# Patient Record
Sex: FEMALE | Race: BLACK OR AFRICAN AMERICAN | Employment: FULL TIME | URBAN - METROPOLITAN AREA
[De-identification: names, ages, dates, MRNs, and addresses within clinical notes are randomized per-mention and may not be internally consistent; named-entity substitution may affect disease eponyms.]

---

## 2020-09-28 ENCOUNTER — TRANSCRIBE ORDER (OUTPATIENT)
Dept: SCHEDULING | Age: 45
End: 2020-09-28

## 2020-09-29 ENCOUNTER — TRANSCRIBE ORDER (OUTPATIENT)
Dept: SCHEDULING | Age: 45
End: 2020-09-29

## 2020-09-29 DIAGNOSIS — S92.202A: ICD-10-CM

## 2020-09-29 DIAGNOSIS — S92.302A CLOSED FRACTURE OF METATARSAL OF LEFT FOOT: Primary | ICD-10-CM

## 2020-10-03 ENCOUNTER — HOSPITAL ENCOUNTER (OUTPATIENT)
Dept: CT IMAGING | Age: 45
Discharge: HOME | End: 2020-10-03
Attending: PODIATRIST
Payer: COMMERCIAL

## 2020-10-03 DIAGNOSIS — S92.202A: ICD-10-CM

## 2020-10-03 DIAGNOSIS — S92.302A CLOSED FRACTURE OF METATARSAL OF LEFT FOOT: ICD-10-CM

## 2020-10-03 PROCEDURE — 73700 CT LOWER EXTREMITY W/O DYE: CPT

## 2021-01-11 ENCOUNTER — TRANSCRIBE ORDER (OUTPATIENT)
Dept: SCHEDULING | Age: 46
End: 2021-01-11

## 2021-01-11 DIAGNOSIS — R79.89 ABNORMAL THYROID SCREEN (BLOOD): Primary | ICD-10-CM

## 2021-01-20 ENCOUNTER — TRANSCRIBE ORDER (OUTPATIENT)
Dept: SCHEDULING | Age: 46
End: 2021-01-20

## 2021-01-20 DIAGNOSIS — R79.89 ABNORMAL THYROID BLOOD TEST: Primary | ICD-10-CM

## 2021-01-21 ENCOUNTER — TRANSCRIBE ORDER (OUTPATIENT)
Dept: SCHEDULING | Age: 46
End: 2021-01-21

## 2021-01-25 ENCOUNTER — HOSPITAL ENCOUNTER (OUTPATIENT)
Dept: NUCLEAR MEDICINE | Age: 46
Discharge: HOME | End: 2021-01-25
Attending: INTERNAL MEDICINE
Payer: COMMERCIAL

## 2021-01-25 DIAGNOSIS — R79.89 ABNORMAL THYROID BLOOD TEST: ICD-10-CM

## 2021-01-25 PROCEDURE — A9516 IODINE I-123 SOD IODIDE MIC: HCPCS

## 2021-01-26 ENCOUNTER — HOSPITAL ENCOUNTER (OUTPATIENT)
Dept: NUCLEAR MEDICINE | Age: 46
Discharge: HOME | End: 2021-01-26
Attending: INTERNAL MEDICINE
Payer: COMMERCIAL

## 2021-01-26 ENCOUNTER — HOSPITAL ENCOUNTER (OUTPATIENT)
Dept: ULTRASOUND IMAGING | Age: 46
Discharge: HOME | End: 2021-01-26
Attending: INTERNAL MEDICINE
Payer: COMMERCIAL

## 2021-01-26 DIAGNOSIS — R79.89 ABNORMAL THYROID BLOOD TEST: ICD-10-CM

## 2021-03-17 NOTE — PERIOP NOTES
PATIENT CALLED AND MADE AWARE OF COVID-19 TESTING NEEDED TO BE DONE WITHIN 96 HOURS OF SURGERY. COVID-19 TESTING APPOINTMENT MADE FOR PATIENT. PATIENT INSTRUCTED ON SELF QUARANTINE BETWEEN TESTING AND ARRIVAL TIME DAY OF SURGERY. PT OUT OF TOWN ON [place] 3/28/21 (TESTING DATE NEEDED). COVID TESTING APPT MADE FOR 3/29/21 AT 0700. PT UNDERSTANDS THAT SURGERY WILL BE RESCHEDULED IF COVID TEST RESULTS NOT IN FOR DOS.

## 2021-03-29 ENCOUNTER — HOSPITAL ENCOUNTER (OUTPATIENT)
Dept: PREADMISSION TESTING | Age: 46
Discharge: HOME | End: 2021-03-29
Payer: COMMERCIAL

## 2021-03-29 ENCOUNTER — TRANSCRIBE ORDER (OUTPATIENT)
Dept: REGISTRATION | Age: 46
End: 2021-03-29

## 2021-03-29 DIAGNOSIS — Z01.812 PRE-PROCEDURE LAB EXAM: ICD-10-CM

## 2021-03-29 DIAGNOSIS — Z01.812 PRE-PROCEDURE LAB EXAM: Primary | ICD-10-CM

## 2021-03-29 PROCEDURE — U0005 INFEC AGEN DETEC AMPLI PROBE: HCPCS

## 2021-03-30 LAB — SARS-COV-2, COV2NT: NOT DETECTED

## 2021-03-31 RX ORDER — CALCITRIOL 0.5 UG/1
0.5 CAPSULE ORAL 2 TIMES DAILY
COMMUNITY

## 2021-03-31 RX ORDER — CALCIUM CARBONATE/VITAMIN D3 600 MG-125
1 TABLET ORAL 2 TIMES DAILY
COMMUNITY

## 2021-03-31 RX ORDER — METHIMAZOLE 5 MG/1
5 TABLET ORAL 2 TIMES DAILY
COMMUNITY

## 2021-03-31 RX ORDER — BISMUTH SUBSALICYLATE 262 MG
1 TABLET,CHEWABLE ORAL DAILY
COMMUNITY

## 2021-03-31 NOTE — PERIOP NOTES
Phone interview completed with patient. Pre-op instructions reviewed and discussed. Medications reviewed and instructions given on when/if to stop/take prior to surgery. Opportunity given for patient to ask questions, and questions answered.

## 2021-04-01 ENCOUNTER — HOSPITAL ENCOUNTER (OUTPATIENT)
Age: 46
Setting detail: OBSERVATION
Discharge: HOME | End: 2021-04-03
Attending: PODIATRIST | Admitting: PODIATRIST
Payer: COMMERCIAL

## 2021-04-01 ENCOUNTER — ANESTHESIA (OUTPATIENT)
Dept: SURGERY | Age: 46
End: 2021-04-01
Payer: COMMERCIAL

## 2021-04-01 ENCOUNTER — ANESTHESIA EVENT (OUTPATIENT)
Dept: SURGERY | Age: 46
End: 2021-04-01
Payer: COMMERCIAL

## 2021-04-01 DIAGNOSIS — M96.0 NONUNION AFTER ARTHRODESIS: ICD-10-CM

## 2021-04-01 DIAGNOSIS — L91.0 HYPERTROPHIC SCAR: Primary | ICD-10-CM

## 2021-04-01 DIAGNOSIS — G57.92 NERVE ENTRAPMENT SYNDROME OF FOOT, LEFT: ICD-10-CM

## 2021-04-01 PROBLEM — M20.32 HALLUX VARUS (ACQUIRED), LEFT FOOT: Status: ACTIVE | Noted: 2021-04-01

## 2021-04-01 PROBLEM — M24.50 CONTRACTED, JOINT: Status: ACTIVE | Noted: 2021-04-01

## 2021-04-01 LAB — HCG UR QL: NEGATIVE

## 2021-04-01 PROCEDURE — 74011250636 HC RX REV CODE- 250/636: Performed by: NURSE ANESTHETIST, CERTIFIED REGISTERED

## 2021-04-01 PROCEDURE — C1713 ANCHOR/SCREW BN/BN,TIS/BN: HCPCS | Performed by: PODIATRIST

## 2021-04-01 PROCEDURE — 77030026438 HC STYL ET INTUB CARD -A: Performed by: ANESTHESIOLOGY

## 2021-04-01 PROCEDURE — 76210000006 HC OR PH I REC 0.5 TO 1 HR: Performed by: PODIATRIST

## 2021-04-01 PROCEDURE — 77030010396 HC WRE FIX C CNMD -A: Performed by: PODIATRIST

## 2021-04-01 PROCEDURE — 99218 HC RM OBSERVATION: CPT

## 2021-04-01 PROCEDURE — 2709999900 HC NON-CHARGEABLE SUPPLY: Performed by: PODIATRIST

## 2021-04-01 PROCEDURE — 74011000250 HC RX REV CODE- 250: Performed by: PODIATRIST

## 2021-04-01 PROCEDURE — 77030040922 HC BLNKT HYPOTHRM STRY -A

## 2021-04-01 PROCEDURE — 77030020743 HC CAP PROTCT PIN BATR -A: Performed by: PODIATRIST

## 2021-04-01 PROCEDURE — 74011250636 HC RX REV CODE- 250/636: Performed by: PODIATRIST

## 2021-04-01 PROCEDURE — 76060000052 HC ANESTHESIA 10.5 TO 11 HR: Performed by: PODIATRIST

## 2021-04-01 PROCEDURE — 77030006831 HC BLD SAW SAG MCRA -B: Performed by: PODIATRIST

## 2021-04-01 PROCEDURE — 77030035129: Performed by: PODIATRIST

## 2021-04-01 PROCEDURE — 76010000150: Performed by: PODIATRIST

## 2021-04-01 PROCEDURE — 81025 URINE PREGNANCY TEST: CPT

## 2021-04-01 PROCEDURE — 77030025006 HC BUR STRY -C: Performed by: PODIATRIST

## 2021-04-01 PROCEDURE — 74011250636 HC RX REV CODE- 250/636: Performed by: ANESTHESIOLOGY

## 2021-04-01 PROCEDURE — 77030002912 HC SUT ETHBND J&J -A: Performed by: PODIATRIST

## 2021-04-01 PROCEDURE — 77030031139 HC SUT VCRL2 J&J -A: Performed by: PODIATRIST

## 2021-04-01 PROCEDURE — 77030020269 HC MISC IMPL: Performed by: PODIATRIST

## 2021-04-01 PROCEDURE — 77030032613 HC BIT DRL TWST METS -B: Performed by: PODIATRIST

## 2021-04-01 PROCEDURE — C9356 TENOGLIDE TENDON PROT, CM2: HCPCS | Performed by: PODIATRIST

## 2021-04-01 PROCEDURE — 74011000250 HC RX REV CODE- 250: Performed by: NURSE ANESTHETIST, CERTIFIED REGISTERED

## 2021-04-01 PROCEDURE — 77030002916 HC SUT ETHLN J&J -A: Performed by: PODIATRIST

## 2021-04-01 PROCEDURE — 77030006788 HC BLD SAW OSC STRY -B: Performed by: PODIATRIST

## 2021-04-01 PROCEDURE — 77030041542: Performed by: PODIATRIST

## 2021-04-01 PROCEDURE — 77030022324 HC BUR OVAL AGRRES BRSM -B: Performed by: PODIATRIST

## 2021-04-01 PROCEDURE — 77030008684 HC TU ET CUF COVD -B: Performed by: ANESTHESIOLOGY

## 2021-04-01 PROCEDURE — 77030039577 HC WRE K APTUS METS -B: Performed by: PODIATRIST

## 2021-04-01 DEVICE — 2.8 CORTICAL SCREW 20MM, HD7, 1/PKG
Type: IMPLANTABLE DEVICE | Site: FOOT | Status: FUNCTIONAL
Brand: APTUS

## 2021-04-01 DEVICE — 2.8 TRILOCK SCREW 24MM, HD7, 1/PKG
Type: IMPLANTABLE DEVICE | Site: FOOT | Status: FUNCTIONAL
Brand: APTUS

## 2021-04-01 DEVICE — TENOGLIDE TENDON PROTECTOR SHEET 4 IN X 5 IN (10CM X 12.5CM)
Type: IMPLANTABLE DEVICE | Site: FOOT | Status: FUNCTIONAL
Brand: TENOGLIDE®

## 2021-04-01 DEVICE — 2.8 TRILOCK SCREW 32MM, HD7, 1/PKG
Type: IMPLANTABLE DEVICE | Site: FOOT | Status: FUNCTIONAL
Brand: APTUS

## 2021-04-01 DEVICE — 2.8 CORTICAL SCREW 18MM, HD7, 1/PKG
Type: IMPLANTABLE DEVICE | Site: FOOT | Status: FUNCTIONAL
Brand: APTUS

## 2021-04-01 DEVICE — DBM 005001 PROGENIX DBM 1CC SRVC FEE
Type: IMPLANTABLE DEVICE | Site: FOOT | Status: FUNCTIONAL
Brand: PROGENIX® PUTTY AND PROGENIX® PLUS

## 2021-04-01 DEVICE — 2.8 TRILOCK SCREW 16MM, HD7, 1/PKG
Type: IMPLANTABLE DEVICE | Site: FOOT | Status: FUNCTIONAL
Brand: APTUS

## 2021-04-01 DEVICE — 2.8 CORTICAL SCREW 36MM, HD7, 1/PKG
Type: IMPLANTABLE DEVICE | Site: FOOT | Status: FUNCTIONAL
Brand: APTUS

## 2021-04-01 DEVICE — 2.8 TRILOCK SCREW 20MM, HD7, 1/PKG
Type: IMPLANTABLE DEVICE | Site: FOOT | Status: FUNCTIONAL
Brand: APTUS

## 2021-04-01 DEVICE — 2.8 TRILOCK SCREW 22MM, HD7, 1/PKG
Type: IMPLANTABLE DEVICE | Site: FOOT | Status: FUNCTIONAL
Brand: APTUS

## 2021-04-01 DEVICE — IMPLANTABLE DEVICE: Type: IMPLANTABLE DEVICE | Site: FOOT | Status: FUNCTIONAL

## 2021-04-01 RX ORDER — DEXAMETHASONE SODIUM PHOSPHATE 4 MG/ML
INJECTION, SOLUTION INTRA-ARTICULAR; INTRALESIONAL; INTRAMUSCULAR; INTRAVENOUS; SOFT TISSUE AS NEEDED
Status: DISCONTINUED | OUTPATIENT
Start: 2021-04-01 | End: 2021-04-01 | Stop reason: HOSPADM

## 2021-04-01 RX ORDER — MIDAZOLAM HYDROCHLORIDE 1 MG/ML
0.5 INJECTION, SOLUTION INTRAMUSCULAR; INTRAVENOUS
Status: DISCONTINUED | OUTPATIENT
Start: 2021-04-01 | End: 2021-04-01 | Stop reason: HOSPADM

## 2021-04-01 RX ORDER — LIDOCAINE HYDROCHLORIDE 20 MG/ML
INJECTION, SOLUTION EPIDURAL; INFILTRATION; INTRACAUDAL; PERINEURAL AS NEEDED
Status: DISCONTINUED | OUTPATIENT
Start: 2021-04-01 | End: 2021-04-01 | Stop reason: HOSPADM

## 2021-04-01 RX ORDER — MIDAZOLAM HYDROCHLORIDE 1 MG/ML
INJECTION, SOLUTION INTRAMUSCULAR; INTRAVENOUS AS NEEDED
Status: DISCONTINUED | OUTPATIENT
Start: 2021-04-01 | End: 2021-04-01 | Stop reason: HOSPADM

## 2021-04-01 RX ORDER — SODIUM CHLORIDE 9 MG/ML
25 INJECTION, SOLUTION INTRAVENOUS CONTINUOUS
Status: DISCONTINUED | OUTPATIENT
Start: 2021-04-01 | End: 2021-04-03 | Stop reason: HOSPADM

## 2021-04-01 RX ORDER — FENTANYL CITRATE 50 UG/ML
25 INJECTION, SOLUTION INTRAMUSCULAR; INTRAVENOUS
Status: DISCONTINUED | OUTPATIENT
Start: 2021-04-01 | End: 2021-04-01 | Stop reason: HOSPADM

## 2021-04-01 RX ORDER — SUCCINYLCHOLINE CHLORIDE 20 MG/ML
INJECTION INTRAMUSCULAR; INTRAVENOUS AS NEEDED
Status: DISCONTINUED | OUTPATIENT
Start: 2021-04-01 | End: 2021-04-01 | Stop reason: HOSPADM

## 2021-04-01 RX ORDER — ONDANSETRON 2 MG/ML
INJECTION INTRAMUSCULAR; INTRAVENOUS AS NEEDED
Status: DISCONTINUED | OUTPATIENT
Start: 2021-04-01 | End: 2021-04-01 | Stop reason: HOSPADM

## 2021-04-01 RX ORDER — SODIUM CHLORIDE, SODIUM LACTATE, POTASSIUM CHLORIDE, CALCIUM CHLORIDE 600; 310; 30; 20 MG/100ML; MG/100ML; MG/100ML; MG/100ML
75 INJECTION, SOLUTION INTRAVENOUS CONTINUOUS
Status: DISCONTINUED | OUTPATIENT
Start: 2021-04-01 | End: 2021-04-01 | Stop reason: HOSPADM

## 2021-04-01 RX ORDER — SODIUM CHLORIDE 9 MG/ML
50 INJECTION, SOLUTION INTRAVENOUS CONTINUOUS
Status: DISCONTINUED | OUTPATIENT
Start: 2021-04-01 | End: 2021-04-01 | Stop reason: HOSPADM

## 2021-04-01 RX ORDER — DEXMEDETOMIDINE HYDROCHLORIDE 100 UG/ML
INJECTION, SOLUTION INTRAVENOUS AS NEEDED
Status: DISCONTINUED | OUTPATIENT
Start: 2021-04-01 | End: 2021-04-01 | Stop reason: HOSPADM

## 2021-04-01 RX ORDER — ONDANSETRON 2 MG/ML
4 INJECTION INTRAMUSCULAR; INTRAVENOUS
Status: DISCONTINUED | OUTPATIENT
Start: 2021-04-01 | End: 2021-04-02

## 2021-04-01 RX ORDER — BUPIVACAINE HYDROCHLORIDE AND EPINEPHRINE 5; 5 MG/ML; UG/ML
INJECTION, SOLUTION EPIDURAL; INTRACAUDAL; PERINEURAL AS NEEDED
Status: DISCONTINUED | OUTPATIENT
Start: 2021-04-01 | End: 2021-04-01 | Stop reason: HOSPADM

## 2021-04-01 RX ORDER — SODIUM CHLORIDE 0.9 % (FLUSH) 0.9 %
5-40 SYRINGE (ML) INJECTION AS NEEDED
Status: DISCONTINUED | OUTPATIENT
Start: 2021-04-01 | End: 2021-04-01 | Stop reason: HOSPADM

## 2021-04-01 RX ORDER — FENTANYL CITRATE 50 UG/ML
50 INJECTION, SOLUTION INTRAMUSCULAR; INTRAVENOUS AS NEEDED
Status: DISCONTINUED | OUTPATIENT
Start: 2021-04-01 | End: 2021-04-01 | Stop reason: HOSPADM

## 2021-04-01 RX ORDER — METHIMAZOLE 5 MG/1
5 TABLET ORAL 2 TIMES DAILY
Status: DISCONTINUED | OUTPATIENT
Start: 2021-04-02 | End: 2021-04-03 | Stop reason: HOSPADM

## 2021-04-01 RX ORDER — LIDOCAINE HYDROCHLORIDE 10 MG/ML
0.1 INJECTION, SOLUTION EPIDURAL; INFILTRATION; INTRACAUDAL; PERINEURAL AS NEEDED
Status: DISCONTINUED | OUTPATIENT
Start: 2021-04-01 | End: 2021-04-01 | Stop reason: HOSPADM

## 2021-04-01 RX ORDER — PROPOFOL 10 MG/ML
INJECTION, EMULSION INTRAVENOUS AS NEEDED
Status: DISCONTINUED | OUTPATIENT
Start: 2021-04-01 | End: 2021-04-01 | Stop reason: HOSPADM

## 2021-04-01 RX ORDER — HYDROMORPHONE HYDROCHLORIDE 1 MG/ML
0.2 INJECTION, SOLUTION INTRAMUSCULAR; INTRAVENOUS; SUBCUTANEOUS
Status: DISCONTINUED | OUTPATIENT
Start: 2021-04-01 | End: 2021-04-01 | Stop reason: HOSPADM

## 2021-04-01 RX ORDER — MIDAZOLAM HYDROCHLORIDE 1 MG/ML
1 INJECTION, SOLUTION INTRAMUSCULAR; INTRAVENOUS AS NEEDED
Status: DISCONTINUED | OUTPATIENT
Start: 2021-04-01 | End: 2021-04-01 | Stop reason: HOSPADM

## 2021-04-01 RX ORDER — ONDANSETRON 2 MG/ML
4 INJECTION INTRAMUSCULAR; INTRAVENOUS AS NEEDED
Status: DISCONTINUED | OUTPATIENT
Start: 2021-04-01 | End: 2021-04-01 | Stop reason: HOSPADM

## 2021-04-01 RX ORDER — CALCITRIOL 0.25 UG/1
0.5 CAPSULE ORAL 2 TIMES DAILY
Status: DISCONTINUED | OUTPATIENT
Start: 2021-04-02 | End: 2021-04-03 | Stop reason: HOSPADM

## 2021-04-01 RX ORDER — SODIUM CHLORIDE 0.9 % (FLUSH) 0.9 %
5-40 SYRINGE (ML) INJECTION EVERY 8 HOURS
Status: DISCONTINUED | OUTPATIENT
Start: 2021-04-01 | End: 2021-04-01 | Stop reason: HOSPADM

## 2021-04-01 RX ORDER — DIPHENHYDRAMINE HYDROCHLORIDE 50 MG/ML
12.5 INJECTION, SOLUTION INTRAMUSCULAR; INTRAVENOUS AS NEEDED
Status: DISCONTINUED | OUTPATIENT
Start: 2021-04-01 | End: 2021-04-01 | Stop reason: HOSPADM

## 2021-04-01 RX ORDER — KETAMINE HYDROCHLORIDE 10 MG/ML
INJECTION, SOLUTION INTRAMUSCULAR; INTRAVENOUS AS NEEDED
Status: DISCONTINUED | OUTPATIENT
Start: 2021-04-01 | End: 2021-04-01 | Stop reason: HOSPADM

## 2021-04-01 RX ORDER — FENTANYL CITRATE 50 UG/ML
INJECTION, SOLUTION INTRAMUSCULAR; INTRAVENOUS AS NEEDED
Status: DISCONTINUED | OUTPATIENT
Start: 2021-04-01 | End: 2021-04-01 | Stop reason: HOSPADM

## 2021-04-01 RX ORDER — ROCURONIUM BROMIDE 10 MG/ML
INJECTION, SOLUTION INTRAVENOUS AS NEEDED
Status: DISCONTINUED | OUTPATIENT
Start: 2021-04-01 | End: 2021-04-01 | Stop reason: HOSPADM

## 2021-04-01 RX ORDER — OXYCODONE AND ACETAMINOPHEN 5; 325 MG/1; MG/1
1 TABLET ORAL AS NEEDED
Status: DISCONTINUED | OUTPATIENT
Start: 2021-04-01 | End: 2021-04-01 | Stop reason: HOSPADM

## 2021-04-01 RX ORDER — HYDROMORPHONE HCL/0.9% NACL/PF 0.5 MG/ML
PLASTIC BAG, INJECTION (ML) INTRAVENOUS
Status: DISCONTINUED | OUTPATIENT
Start: 2021-04-01 | End: 2021-04-02

## 2021-04-01 RX ORDER — SODIUM CHLORIDE, SODIUM LACTATE, POTASSIUM CHLORIDE, CALCIUM CHLORIDE 600; 310; 30; 20 MG/100ML; MG/100ML; MG/100ML; MG/100ML
INJECTION, SOLUTION INTRAVENOUS
Status: DISCONTINUED | OUTPATIENT
Start: 2021-04-01 | End: 2021-04-01 | Stop reason: HOSPADM

## 2021-04-01 RX ORDER — HYDROMORPHONE HYDROCHLORIDE 2 MG/ML
INJECTION, SOLUTION INTRAMUSCULAR; INTRAVENOUS; SUBCUTANEOUS AS NEEDED
Status: DISCONTINUED | OUTPATIENT
Start: 2021-04-01 | End: 2021-04-01 | Stop reason: HOSPADM

## 2021-04-01 RX ADMIN — PROPOFOL 100 MG: 10 INJECTION, EMULSION INTRAVENOUS at 22:18

## 2021-04-01 RX ADMIN — KETAMINE HYDROCHLORIDE 10 MG: 10 INJECTION, SOLUTION INTRAMUSCULAR; INTRAVENOUS at 12:45

## 2021-04-01 RX ADMIN — SODIUM CHLORIDE, POTASSIUM CHLORIDE, SODIUM LACTATE AND CALCIUM CHLORIDE: 600; 310; 30; 20 INJECTION, SOLUTION INTRAVENOUS at 11:10

## 2021-04-01 RX ADMIN — DEXMEDETOMIDINE HYDROCHLORIDE 10 MCG: 100 INJECTION, SOLUTION, CONCENTRATE INTRAVENOUS at 21:52

## 2021-04-01 RX ADMIN — HYDROMORPHONE HYDROCHLORIDE 0.5 MG: 2 INJECTION, SOLUTION INTRAMUSCULAR; INTRAVENOUS; SUBCUTANEOUS at 20:51

## 2021-04-01 RX ADMIN — PROPOFOL 200 MG: 10 INJECTION, EMULSION INTRAVENOUS at 12:06

## 2021-04-01 RX ADMIN — HYDROMORPHONE HYDROCHLORIDE 0.5 MG: 2 INJECTION, SOLUTION INTRAMUSCULAR; INTRAVENOUS; SUBCUTANEOUS at 16:18

## 2021-04-01 RX ADMIN — KETAMINE HYDROCHLORIDE 10 MG: 10 INJECTION, SOLUTION INTRAMUSCULAR; INTRAVENOUS at 12:32

## 2021-04-01 RX ADMIN — DEXMEDETOMIDINE HYDROCHLORIDE 6 MCG: 100 INJECTION, SOLUTION, CONCENTRATE INTRAVENOUS at 18:58

## 2021-04-01 RX ADMIN — SODIUM CHLORIDE, POTASSIUM CHLORIDE, SODIUM LACTATE AND CALCIUM CHLORIDE: 600; 310; 30; 20 INJECTION, SOLUTION INTRAVENOUS at 17:59

## 2021-04-01 RX ADMIN — ONDANSETRON HYDROCHLORIDE 4 MG: 2 INJECTION, SOLUTION INTRAMUSCULAR; INTRAVENOUS at 20:53

## 2021-04-01 RX ADMIN — WATER 2 G: 1 INJECTION INTRAMUSCULAR; INTRAVENOUS; SUBCUTANEOUS at 16:42

## 2021-04-01 RX ADMIN — SODIUM CHLORIDE 25 ML/HR: 9 INJECTION, SOLUTION INTRAVENOUS at 23:05

## 2021-04-01 RX ADMIN — FENTANYL CITRATE 25 MCG: 50 INJECTION, SOLUTION INTRAMUSCULAR; INTRAVENOUS at 15:24

## 2021-04-01 RX ADMIN — SODIUM CHLORIDE, POTASSIUM CHLORIDE, SODIUM LACTATE AND CALCIUM CHLORIDE 75 ML/HR: 600; 310; 30; 20 INJECTION, SOLUTION INTRAVENOUS at 10:30

## 2021-04-01 RX ADMIN — SODIUM CHLORIDE: 900 INJECTION, SOLUTION INTRAVENOUS at 22:09

## 2021-04-01 RX ADMIN — MIDAZOLAM 3 MG: 1 INJECTION INTRAMUSCULAR; INTRAVENOUS at 11:59

## 2021-04-01 RX ADMIN — LIDOCAINE HYDROCHLORIDE 60 MG: 20 INJECTION, SOLUTION EPIDURAL; INFILTRATION; INTRACAUDAL; PERINEURAL at 12:06

## 2021-04-01 RX ADMIN — WATER 2 G: 1 INJECTION INTRAMUSCULAR; INTRAVENOUS; SUBCUTANEOUS at 20:51

## 2021-04-01 RX ADMIN — WATER 2 G: 1 INJECTION INTRAMUSCULAR; INTRAVENOUS; SUBCUTANEOUS at 12:24

## 2021-04-01 RX ADMIN — FENTANYL CITRATE 50 MCG: 50 INJECTION, SOLUTION INTRAMUSCULAR; INTRAVENOUS at 14:42

## 2021-04-01 RX ADMIN — KETAMINE HYDROCHLORIDE 10 MG: 10 INJECTION, SOLUTION INTRAMUSCULAR; INTRAVENOUS at 12:24

## 2021-04-01 RX ADMIN — FENTANYL CITRATE 25 MCG: 50 INJECTION, SOLUTION INTRAMUSCULAR; INTRAVENOUS at 12:06

## 2021-04-01 RX ADMIN — HYDROMORPHONE HYDROCHLORIDE 0.5 MG: 2 INJECTION, SOLUTION INTRAMUSCULAR; INTRAVENOUS; SUBCUTANEOUS at 20:12

## 2021-04-01 RX ADMIN — KETAMINE HYDROCHLORIDE 10 MG: 10 INJECTION, SOLUTION INTRAMUSCULAR; INTRAVENOUS at 12:39

## 2021-04-01 RX ADMIN — SUCCINYLCHOLINE CHLORIDE 160 MG: 20 INJECTION, SOLUTION INTRAMUSCULAR; INTRAVENOUS at 12:07

## 2021-04-01 RX ADMIN — Medication: at 23:00

## 2021-04-01 RX ADMIN — KETAMINE HYDROCHLORIDE 10 MG: 10 INJECTION, SOLUTION INTRAMUSCULAR; INTRAVENOUS at 12:28

## 2021-04-01 RX ADMIN — ROCURONIUM BROMIDE 10 MG: 10 SOLUTION INTRAVENOUS at 12:06

## 2021-04-01 RX ADMIN — DEXMEDETOMIDINE HYDROCHLORIDE 4 MCG: 100 INJECTION, SOLUTION, CONCENTRATE INTRAVENOUS at 19:04

## 2021-04-01 RX ADMIN — HYDROMORPHONE HYDROCHLORIDE 0.5 MG: 2 INJECTION, SOLUTION INTRAMUSCULAR; INTRAVENOUS; SUBCUTANEOUS at 12:31

## 2021-04-01 RX ADMIN — DEXAMETHASONE SODIUM PHOSPHATE 8 MG: 4 INJECTION, SOLUTION INTRAMUSCULAR; INTRAVENOUS at 12:23

## 2021-04-01 NOTE — ANESTHESIA PREPROCEDURE EVALUATION
Relevant Problems   No relevant active problems       Anesthetic History   No history of anesthetic complications            Review of Systems / Medical History  Patient summary reviewed, nursing notes reviewed and pertinent labs reviewed    Pulmonary  Within defined limits                 Neuro/Psych   Within defined limits           Cardiovascular  Within defined limits                Exercise tolerance: >4 METS     GI/Hepatic/Renal     GERD: well controlled           Endo/Other      Hypothyroidism  Morbid obesity     Other Findings   Comments: Denies pregnancy           Physical Exam    Airway  Mallampati: III  TM Distance: 4 - 6 cm  Neck ROM: normal range of motion, short neck   Mouth opening: Normal     Cardiovascular  Regular rate and rhythm,  S1 and S2 normal,  no murmur, click, rub, or gallop  Rhythm: regular  Rate: normal         Dental    Dentition: Caps/crowns and Implants     Pulmonary  Breath sounds clear to auscultation               Abdominal  GI exam deferred       Other Findings            Anesthetic Plan    ASA: 3  Anesthesia type: general          Induction: Intravenous  Anesthetic plan and risks discussed with: Patient Quality 47: Advance Care Plan: Advance Care Planning discussed and documented; advance care plan or surrogate decision maker documented in the medical record.

## 2021-04-01 NOTE — PERIOP NOTES
1300:  Lucia Cameron [phone] updated on start of surgical procedure by RN.     1500:  updated on surgery progress

## 2021-04-02 PROCEDURE — 74011250636 HC RX REV CODE- 250/636: Performed by: STUDENT IN AN ORGANIZED HEALTH CARE EDUCATION/TRAINING PROGRAM

## 2021-04-02 PROCEDURE — 74011250637 HC RX REV CODE- 250/637: Performed by: PODIATRIST

## 2021-04-02 PROCEDURE — 96374 THER/PROPH/DIAG INJ IV PUSH: CPT

## 2021-04-02 PROCEDURE — 97161 PT EVAL LOW COMPLEX 20 MIN: CPT

## 2021-04-02 PROCEDURE — 74011000250 HC RX REV CODE- 250: Performed by: PODIATRIST

## 2021-04-02 PROCEDURE — 97116 GAIT TRAINING THERAPY: CPT

## 2021-04-02 PROCEDURE — 99218 HC RM OBSERVATION: CPT

## 2021-04-02 PROCEDURE — 74011250636 HC RX REV CODE- 250/636: Performed by: PODIATRIST

## 2021-04-02 PROCEDURE — 74011250637 HC RX REV CODE- 250/637: Performed by: STUDENT IN AN ORGANIZED HEALTH CARE EDUCATION/TRAINING PROGRAM

## 2021-04-02 RX ORDER — PROMETHAZINE HYDROCHLORIDE 25 MG/1
25 TABLET ORAL
Status: DISCONTINUED | OUTPATIENT
Start: 2021-04-02 | End: 2021-04-03 | Stop reason: HOSPADM

## 2021-04-02 RX ORDER — OXYCODONE AND ACETAMINOPHEN 7.5; 325 MG/1; MG/1
1 TABLET ORAL
Status: DISCONTINUED | OUTPATIENT
Start: 2021-04-02 | End: 2021-04-02

## 2021-04-02 RX ORDER — OXYCODONE HYDROCHLORIDE 5 MG/1
7.5 TABLET ORAL
Status: DISCONTINUED | OUTPATIENT
Start: 2021-04-02 | End: 2021-04-02

## 2021-04-02 RX ORDER — ACETAMINOPHEN 325 MG/1
325 TABLET ORAL EVERY 4 HOURS
Status: DISCONTINUED | OUTPATIENT
Start: 2021-04-02 | End: 2021-04-03 | Stop reason: HOSPADM

## 2021-04-02 RX ORDER — OXYCODONE HYDROCHLORIDE 5 MG/1
10 TABLET ORAL
Status: DISCONTINUED | OUTPATIENT
Start: 2021-04-02 | End: 2021-04-03 | Stop reason: HOSPADM

## 2021-04-02 RX ORDER — OXYCODONE AND ACETAMINOPHEN 7.5; 325 MG/1; MG/1
1 TABLET ORAL
Qty: 15 TAB | Refills: 0 | Status: SHIPPED | OUTPATIENT
Start: 2021-04-02 | End: 2021-04-05

## 2021-04-02 RX ORDER — PROMETHAZINE HYDROCHLORIDE 12.5 MG/1
25 TABLET ORAL
Qty: 30 TAB | Refills: 2 | Status: SHIPPED | OUTPATIENT
Start: 2021-04-02

## 2021-04-02 RX ORDER — HYDROMORPHONE HYDROCHLORIDE 1 MG/ML
0.2 INJECTION, SOLUTION INTRAMUSCULAR; INTRAVENOUS; SUBCUTANEOUS
Status: DISCONTINUED | OUTPATIENT
Start: 2021-04-02 | End: 2021-04-03 | Stop reason: HOSPADM

## 2021-04-02 RX ADMIN — OXYCODONE 10 MG: 5 TABLET ORAL at 21:26

## 2021-04-02 RX ADMIN — HYDROMORPHONE HYDROCHLORIDE 0.2 MG: 1 INJECTION, SOLUTION INTRAMUSCULAR; INTRAVENOUS; SUBCUTANEOUS at 22:44

## 2021-04-02 RX ADMIN — ACETAMINOPHEN 325 MG: 325 TABLET ORAL at 16:58

## 2021-04-02 RX ADMIN — OXYCODONE 10 MG: 5 TABLET ORAL at 16:58

## 2021-04-02 RX ADMIN — METHIMAZOLE 5 MG: 5 TABLET ORAL at 17:00

## 2021-04-02 RX ADMIN — OXYCODONE 7.5 MG: 5 TABLET ORAL at 13:07

## 2021-04-02 RX ADMIN — ONDANSETRON 4 MG: 2 INJECTION INTRAMUSCULAR; INTRAVENOUS at 05:27

## 2021-04-02 RX ADMIN — CALCITRIOL CAPSULES 0.25 MCG 0.5 MCG: 0.25 CAPSULE ORAL at 09:16

## 2021-04-02 RX ADMIN — ACETAMINOPHEN 325 MG: 325 TABLET ORAL at 21:27

## 2021-04-02 RX ADMIN — ACETAMINOPHEN 325 MG: 325 TABLET ORAL at 13:07

## 2021-04-02 RX ADMIN — HYDROMORPHONE HYDROCHLORIDE 0.2 MG: 1 INJECTION, SOLUTION INTRAMUSCULAR; INTRAVENOUS; SUBCUTANEOUS at 11:59

## 2021-04-02 RX ADMIN — CALCITRIOL CAPSULES 0.25 MCG 0.5 MCG: 0.25 CAPSULE ORAL at 18:00

## 2021-04-02 RX ADMIN — HYDROMORPHONE HYDROCHLORIDE 0.2 MG: 1 INJECTION, SOLUTION INTRAMUSCULAR; INTRAVENOUS; SUBCUTANEOUS at 18:26

## 2021-04-02 RX ADMIN — METHIMAZOLE 5 MG: 5 TABLET ORAL at 09:16

## 2021-04-02 RX ADMIN — OXYCODONE HYDROCHLORIDE AND ACETAMINOPHEN 1 TABLET: 7.5; 325 TABLET ORAL at 09:16

## 2021-04-02 RX ADMIN — HYDROMORPHONE HYDROCHLORIDE 0.2 MG: 1 INJECTION, SOLUTION INTRAMUSCULAR; INTRAVENOUS; SUBCUTANEOUS at 14:53

## 2021-04-02 RX ADMIN — CEFAZOLIN SODIUM 2 G: 1 INJECTION, POWDER, FOR SOLUTION INTRAMUSCULAR; INTRAVENOUS at 13:09

## 2021-04-02 RX ADMIN — PROMETHAZINE HYDROCHLORIDE 25 MG: 25 TABLET ORAL at 16:58

## 2021-04-02 RX ADMIN — CEFAZOLIN SODIUM 2 G: 1 INJECTION, POWDER, FOR SOLUTION INTRAMUSCULAR; INTRAVENOUS at 05:19

## 2021-04-02 NOTE — OP NOTES
Operative Report    Patient: Fred Baldwin MRN: [id number]  SSN: [id number]    YOB: 1975  Age: 39 y.o. Sex: female       Date of Surgery: 4/1/2021     Preoperative Diagnosis: NONUNION AFTER ARTHRODESIS, HYPERTROPHIC SCAR, CONTRACTED JOINT, NERVE ENTRAPMENT SYNDROME - LEFT FOOT     Postoperative Diagnosis: NONUNION AFTER ARTHRODESIS, HYPERTROPHIC SCAR, CONTRACTED JOINT, NERVE ENTRAPMENT SYNDROME - LEFT FOOT     Surgeon(s) and Role:     * Cooper Blake DPM - Primary     * Debbie Akhtar DPM    Anesthesia: General     Procedure: Procedure(s):  REPAIR OF NONUNION TARSAL METATARSAL 1, RELEASE OF METATARSAL PHALANGEAL JOINT 1 WITH LENGTHENING OF EXTENSOR HALLUCIS LONGUS, REVISION OF SCAR, RELEASE OF NERVE, HARDWARE REMOVAL, BONE MARROW ASPIRATION - APPLICATION OF INCISIONAL WOUND VAC-LEFT FOOT     Procedure in Detail: Pt was brought into OR in stretcher, moved OR table, and positioned in supine. A team time out was conducted with all team members present. Anesthesia sedated patient and gave the go ahead for a local block. A local block was performed with 20cc of 0.5% bupivacaine with epi in a conte block like fashion. A thigh tourniquet was placed but not inflated. Pt was prepped and draped in a normal sterile fashion. Attention was drawn to the left foot, cicatrix overlying 1st mtpj was excised using 10 blade. Incision was then carried down to the level of the fascia. Any bleeders or blood vessels were cauterized with bovie. At this time, the medial dorsal cutaenous nerve was identified proximally to the previous cicatrix and then dissected/isolated distally in order to release it from the extensive hyperfibrotic soft tissue. The extensor tendon was identified and also noted to be surrounded in extensive hyperfibrotic soft tissue. The 1st MTP joint was exposed, using mcglamry's elevator to release any adhesions to the metatarsal head.  However the hallux remained in a dorsiflexed position and the EHL was bowstrung. At this time, it was decided to lengthen tendon with a z lengthening but anticipated repair once osseous work was completed. The proximal aspect of the incision was then carried down to the level of the bone. 2 staples were identified and a screw. These were then explanted. At this time, the 1st tarsometatarsal joing was identified to have a non-union and laterally displaced distal fragment. The non-union site was then resected and debrided down to the level of healthy bleeding bone, paprika sign observed. Medial cuneiform and 1st met were fenestrated with a 2.0 drill. A deficit in length was identified and measured to be 20mm. A tricortical allograft was used in order to gain length and restore the parabola, verified on fluroscopy. Due to the previous nonunion, bone marrow aspirate was obtained from the distal tibia on the medial side. [place] site confirmed with fluroscopy. The allograft was then soaked in the bone marrow aspirate. The positioning of the graft into the 1st TMT realigned the 1st ray, reducing the IM angle while restoring the length of the parabola. This was confirmed on fluoroscopy. Demineralized bone matrix was used to augment any small crevices between either side of the graft. Provisional fixation obtained with 0.062in kwires. 90-90 co-plating technique was used to secure graft and maintain positioning. Dorsal plate was applied using a combination of locking and non-locking 2.8mm screws per 's protocol while observing AO principles. A medial plate was applied using a combination of locking and non-locking 2.8mm screws per 's protocol while observing AO principles. Positioning was confirmed on fluoroscopy. At this time, the EHL was repaired with 2.0 vycril at physiological tension. The incision site was then irrigated with copious amounts of sterile normal saline. Deep closure was done with 4.0 vycril.  Skin closure with 4.0 nylon. An incisional vac was applied per 's protocol. A posterior splint was applied. Pt was then awakened by anesthesia, moved to stretcher, and transferred to PACU in a normal and stable condition. Estimated Blood Loss:  <50cc    Tourniquet Time: * No tourniquets in log *      Implants:   Implant Name Type Inv. Item Serial No.  Lot No. LRB No. Used Action   GRAFT BNE L TRICORT BLK FOR OSTEOTMY ALLOPURE - [id number]  GRAFT BNE L TRICORT BLK FOR OSTEOTMY ALLOPURE [id number] [address] [employer]_WD NA Left 1 Implanted   2.8 MTP Revision Plate 5 Right   NA  NA Left 1 Implanted   2.8 lockingstraight plate 8 hole Screw  NA  NA Left 1 Implanted   GRAFT BNE SUB 1CC DEMIN BNE MTRX PTTY FRZ DRY PROGENIX - [id number]  GRAFT BNE SUB 1CC DEMIN BNE MTRX PTTY FRZ DRY PROGENIX [id number] [employer] SPINALGRA[employer]_ [id number] Left 1 Implanted   PROTECTOR TEND SHT IMPL 4X5IN -- TENOGLIDE - SNA  PROTECTOR TEND SHT IMPL 4X5IN -- TENOGLIDE NA [employer] TWAN [id number] Left 1 Implanted   SCREW BNE 2.8X20MM HEBER G PKG 1 HEXADRIVE 7 - SNA  SCREW BNE 2.8X20MM HEBER G PKG 1 HEXADRIVE 7 NA [employer]WD NA Left 1 Implanted   SCREW BNE L36MM OD28MM HEBER TI ALLY ST SELF DRL LO PROF - SNA  SCREW BNE L36MM OD28MM HEBER TI ALLY ST SELF DRL LO PROF NA MEDARTIS INC_WD NA Left 1 Implanted   SCREW BNE L16MM DIA2. 8MM HEBER TI ST SELF DRL NONLOCKING HEX - SNA  SCREW BNE L16MM DIA2. 8MM HEBER TI ST SELF DRL NONLOCKING HEX NA [employer]WD NA Left 4 Implanted   SCREW 2.8X32MM HEXA DRV 7 BENJY TRILOK - SNA  SCREW 2.8X32MM HEXA DRV 7 BENJY [employer]ILOK NA [employer]WD NA Left 1 Implanted   SCREW BNE 2.8X20MM BENJY PKG 1 HEXADRIVE 7 TRILOK - SNA  SCREW BNE 2.8X20MM BENJY PKG 1 HEXADRIVE 7 TRILOK NA [employer]_WD NA Left 1 Implanted   SCREW BNE 2.8X20MM BENJY PKG 1 HEXADRIVE 7 TRILOK - SNA  SCREW BNE 2.8X20MM BENJY PKG 1 HEXADRIVE 7 TRILOK NA [employer]_WD NA Left 1 Implanted   SCREW BNE 2.8X18MM HEBER G PKG 1 HEXADRIVE 7 - SNA  SCREW BNE 2.8X18MM HEBER G PKG 1 HEXADRIVE 7 NA [employer]_WD NA Left 1 Implanted   SCREW BNE [id number] BENJY PKG 1 HEXADRIVE 7 TRILOK - SNA  SCREW BNE [id number] BENJY PKG 1 HEXADRIVE 7 TRILOK NA [employer] INC_WD NA Left 1 Implanted   SCREW BNE [id number] BENJY PKG 1 HEXADRIVE 7 TRILOK - SNA  SCREW BNE [id number] BENJY PKG 1 HEXADRIVE 7 TRILOK NA [employer] INC_WD NA Left 1 Implanted               Specimens: * No specimens in log *        Drains: None                Complications: None    Counts: Sponge and needle counts were correct times two.     Signed By:  Ursula Meyer DPM     April 1, 2021

## 2021-04-02 NOTE — PROGRESS NOTES
Foot and Ankle Surgery Progress Note    Patient: Randy Guillermo MRN: [id number]  SSN: [id number]    YOB: 1975  Age: 39 y.o. Sex: female      Admit Date: 2021    1 Day Post-Op    Procedure:  Procedure(s):  REPAIR OF NONUNION TARSAL METATARSAL 1, RELEASE OF METATARSAL PHALANGEAL JOINT 1 WITH LENGTHENING OF EXTENSOR HALLUCIS LONGUS, REVISION OF SCAR, RELEASE OF NERVE, HARDWARE REMOVAL, BONE MARROW ASPIRATION - APPLICATION OF INCISIONAL WOUND VAC-LEFT FOOT    Subjective:   Pt seen at bedside this afternoon. Patient has complaints of pain and headache. .     Objective:     Visit Vitals  BP (!) 147/73 (BP 1 Location: Right upper arm, BP Patient Position: At rest)   Pulse 67   Temp 98.1 °F (36.7 °C)   Resp 16   Ht 5' 4.5\" (1.638 m)   Wt 104.5 kg (230 lb 6.1 oz)   SpO2 98%   BMI 38.93 kg/m²       Temp (24hrs), Av.8 °F (36.6 °C), Min:97.5 °F (36.4 °C), Max:98.4 °F (36.9 °C)      Physical Exam:    GENERAL: alert, cooperative, no distress, appears stated age  LLE with splint in place, clean, dry, and intact. Wound vac in place, trouble shooting required.   LLE with aROM of digits, pin sites clean and dry    Lab Review:   BMP: No results found for: NA, K, CL, CO2, AGAP, GLU, BUN, CREA, GFRAA, GFRNA  CBC: No results found for: WBC, HGB, HGBEXT, HCT, HCTEXT, PLT, PLTEXT, HGBEXT, HCTEXT, PLTEXT    Assessment:     Hospital Problems  Never Reviewed          Codes Class Noted POA    Contracted, joint ICD-10-CM: M24.50  ICD-9-CM: 718.40  2021 Unknown        Hypertrophic scar ICD-10-CM: L91.0  ICD-9-CM: 701.4  2021 Unknown        Nonunion of subtalar arthrodesis ICD-10-CM: M96.0  ICD-9-CM: 996.78  2021 Unknown        Nerve entrapment syndrome of foot, left ICD-10-CM: G57.92  ICD-9-CM: 355.8  2021 Unknown        Nerve entrapment syndrome of left foot ICD-10-CM: G57.92  ICD-9-CM: 355.8  2021 Unknown        Hallux varus (acquired), left foot ICD-10-CM: M20.32  ICD-9-CM: 735.1  2021 Unknown        Nonunion after arthrodesis ICD-10-CM: M96.0  ICD-9-CM: 996.49, V45.4  2021 Unknown              Plan/Recommendations/Medical Decision MakinF admitted for pain control 2/2 revision of non-union and nerve entrapment. Pt was taken off PCA this AM and pain was not well controlled with PO regimen. Several adjustments made during the day. Pt will stay another night to further control her pain. Pain was also exacerbated by PT session. Plan: continue with new po pain regimen. Will transition back to PCA dilaudid if po regimen is ineffective overnight. Pt tolerated dilaudid well despite morphine allergy (diffused pruritus). #IncisionVac:  Vac may be changed to house vac per wound team. Dressing to remain intact.       Signed By: Partha De La Cruz DPM     2021

## 2021-04-02 NOTE — WOUND CARE
Made aware of Prevena with questionable seal.  
Prevena placed in OR last night by Dr. Sanjay Munoz. Discussed with Dr. Marielos Arnett by phone. He is aware of concerns for seal due to \"chugging\" noise of pump and he plans to see tomorrow morning.    
Abby Sweeney, CWOCN

## 2021-04-02 NOTE — PROGRESS NOTES
Transition of Care Plan   RUR- Observation   DISPOSITION: Home with family assistance; pending medical recommendation   F/U with PCP/Specialist     Transport: Mother; Faith Liang [phone]      Reason for Admission:  Surgical procedure on left fooot                     RUR Score:      Observation                Plan for utilizing home health:          PCP: First and Last name:  Amy Tyson MD     Name of Practice:    Are you a current patient: Yes/No: Yes    Approximate date of last visit: 3 weeks ago    Can you participate in a virtual visit with your PCP: Yes                     Current Advanced Directive/Advance Care Plan: No Order      Healthcare Decision Maker:   Click here to complete [email or web address] including selection of the Healthcare Decision Maker Relationship (ie \"Primary\")           Primary Decision Maker;  Florentino Guzman [phone]  Secondary Decision Maker; Mother [phone]                  Transition of Care Plan:                      The cm met the pt at bedside to conduct the initial evaluation. The pt presented as aox4. The pt confirmed her demographics, insurance provider, and PCP. 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 The pt identified that she last seen her PCP, 3 weeks ago. Per the pt, she utilizes the [facility] in [place] to fill her scripts. The does not have a hx of HH or staying at an IPR. The pt ambulates without the use of a walker or cane. The pt disposition is home, pending pt's evaluation. The pt stated that her mother would provide transport at [place]. The cm will continue to follow for [facility] needs. Care Management Interventions  PCP Verified by CM: Yes  Mode of Transport at Discharge:  Other (see comment)(Mother; Faith Liang [phone])  Transition of Care Consult (CM Consult): Discharge Planning  MyChart Signup: Yes  Discharge Durable Medical Equipment: No  Occupational Therapy Consult: No  Speech Therapy Consult: No  Current Support Network: Lives with Spouse, Family Lives Nearby(The pt currently lives with her  and children.)  Confirm Follow Up Transport: Self  Discharge Location  Discharge Placement: Home(The pt currently lives [facility] tri level home with 5 exterior stairs and a second floor master. )\Observation notice provided in writing to patient and/or caregiver as well as verbal explanation of the policy. Patients who are in outpatient status also receive the Observation notice.     CM: [address] [place],   [phone]

## 2021-04-02 NOTE — DISCHARGE INSTRUCTIONS
INSTRUCTIONS FOLLOWING FOOT SURGERY    ACTIVITY:  · Elevate feet for 48 hours  · Use ice as directed by your doctor  · Use crutches / walker as directed by your doctor, and follow your doctors instructions as to your weight bearing status  -  No weight on the left foot     TAKE 1 ADULT ASPIRIN (325mg) DAILY    DIET:  · Clear liquids until no nausea or vomiting; then light diet for the first day  · An advance to regular diet on second day, unless your doctor orders otherwise. PAIN:  · Take pain medication as directed by your doctor. · Call your doctor if pain is not relieved by medication. · Do not take aspirin or blood thinners until directed by your doctor. DRESSING CARE: keep dressing clean and dry, do not remove       FOLLOW-UP PHONE CALLS:  · Calls will be made by nursing staff. · If you had any problems, call your doctor as needed. CALL YOUR DOCTOR IF:  · Excessive bleeding that does not stop after holding mild pressure over the area  · Temperature of 101° F or above  · Redness, excessive swelling or bruising, and/or green or yellow, smelly discharge from incision  · Loss of sensation -cold, white, or blue toes    AFTER ANESTHESIA :   · For the first 24 hours: do not drive, drink alcoholic beverages, or make important decisions. · Be aware of dizziness following anesthesia and while taking pain medication.       DISCHARGE MEDICATIONS:         APPOINTMENT DATE/TIME: please call for an appointment    YOUR DOCTORS PHONE NUMBER: [phone]    Patients signature:  Date: 4/1/2021  Discharging Nurse:

## 2021-04-02 NOTE — BRIEF OP NOTE
Brief Postoperative Note    Patient: Chin Dunbar  YOB: 1975  MRN: [id number]    Date of Procedure: 4/1/2021     Pre-Op Diagnosis: NONUNION AFTER ARTHRODESIS, HYPERTROPHIC SCAR, CONTRACTED JOINT, NERVE ENTRAPMENT SYNDROME - LEFT FOOT    Post-Op Diagnosis: Same as preoperative diagnosis. Procedure(s):  REPAIR OF NONUNION TARSAL METATARSAL 1, RELEASE OF METATARSAL PHALANGEAL JOINT 1 WITH LENGTHENING OF EXTENSOR HALLUCIS LONGUS, REVISION OF SCAR, RELEASE OF NERVE, HARDWARE REMOVAL, BONE MARROW ASPIRATION - APPLICATION OF INCISIONAL WOUND VAC-LEFT FOOT    Surgeon(s):  EVONNE Ramirez DPM    Surgical Assistant: None    Anesthesia: General     Estimated Blood Loss (mL): less than 052     Complications: None    Specimens: * No specimens in log *     Implants:   Implant Name Type Inv. Item Serial No.  Lot No. LRB No. Used Action   GRAFT BNE L TRICORT BLK FOR OSTEOTMY ALLOPURE - [id number]  GRAFT BNE L TRICORT BLK FOR OSTEOTMY ALLOPURE [id number] [address] [employer]_WD NA Left 1 Implanted   2.8 MTP Revision Plate 5 Right   NA  NA Left 1 Implanted   2.8 lockingstraight plate 8 hole Screw  NA  NA Left 1 Implanted   GRAFT BNE SUB 1CC DEMIN BNE MTRX PTTY FRZ DRY PROGENIX - [id number]  GRAFT BNE SUB 1CC DEMIN BNE MTRX PTTY FRZ DRY PROGENIX [id number] [employer] SPINALGRAFT TECH_WD [id number] Left 1 Implanted   PROTECTOR TEND SHT IMPL 4X5IN -- TENOGLIDE - SNA  PROTECTOR TEND SHT IMPL 4X5IN -- TENOGLIDE NA INTEGRA [employer] TWAN [id number] Left 1 Implanted   SCREW BNE 2.8X20MM HEBER G PKG 1 HEXADRIVE 7 - SNA  SCREW BNE 2.8X20MM HEBER G PKG 1 HEXADRIVE 7 NA MEDARTIS INC_WD NA Left 1 Implanted   SCREW BNE L36MM OD28MM HEBER TI ALLY ST SELF DRL LO PROF - SNA  SCREW BNE L36MM OD28MM HEBER TI ALLY ST SELF DRL LO PROF NA MEDARTIS INC_WD NA Left 1 Implanted   SCREW BNE L16MM DIA2. 8MM HEBER TI ST SELF DRL NONLOCKING HEX - SNA  SCREW BNE L16MM DIA2. 8MM HEBER TI ST SELF DRL NONLOCKING HEX NA [employer] NA Left 4 Implanted   SCREW 2.8X32MM HEXA DRV 7 BENJY TRILOK - SNA  SCREW 2.8X32MM HEXA DRV 7 BENJY [employer]ILOK NA [facility]IS [facility] NA Left 1 Implanted   SCREW BNE 2.8X20MM BENJY PKG 1 HEXADRIVE 7 TRILOK - SNA  SCREW BNE 2.8X20MM BENJY PKG 1 HEXADRIVE 7 TRILOK NA MEDARTIS [facility] NA Left 1 Implanted   SCREW BNE 2.8X20MM BENJY PKG 1 HEXADRIVE 7 TRILOK - SNA  SCREW BNE 2.8X20MM BENJY PKG 1 HEXADRIVE 7 TRILOK NA MEDARTIS [facility] NA Left 1 Implanted   SCREW BNE 2.8X18MM HEBER G PKG 1 HEXADRIVE 7 - SNA  SCREW BNE 2.8X18MM HEBER G PKG 1 HEXADRIVE 7 NA MEDARTIS [facility] NA Left 1 Implanted   SCREW BNE [id number] BENJY PKG 1 HEXADRIVE 7 TRILOK - SNA  SCREW BNE [id number] BENJY PKG 1 HEXADRIVE 7 TRILOK NA MEDARTIS [facility] NA Left 1 Implanted   SCREW BNE [id number] BENJY PKG 1 HEXADRIVE 7 TRILOK - SNA  SCREW BNE [id number] BENJY PKG 1 HEXADRIVE 7 TRILOK NA MEDARTIS [employer] NA Left 1 Implanted       Drains: * No LDAs found *    Findings: hardware (2 staples, 1 screw)    Electronically Signed by Elsy Peterson DPM on 4/1/2021 at 10:42 PM

## 2021-04-02 NOTE — PROGRESS NOTES
Per Dr. Emperatriz Rowe, place order for IV dilaudid 0.2 mg every three hours as needed. 1240 Per Dr. Emperatriz Rowe, discontinue PRN percocet 7.5 mg and order oxycodone 7.5 mg every 4 hours PRN as well as tylenol 325 mg every four hours scheduled. Order given via telephone with read back. 1446 Per Dr. Emperatriz Rowe, modify oxycodone 7.5 mg every 4 hours PRN to oxycodone 10 mg every 4 hours PRN. Order given via telephone with read back.     1532 Verbal order with read back given by Dr. Emperatriz Rowe to discontinue PRN zofran and order Phenergan 25 mg tablet Q6 hours

## 2021-04-02 NOTE — ANESTHESIA POSTPROCEDURE EVALUATION
Post-Anesthesia Evaluation and Assessment    Patient: Fred Baldwin MRN: [id number]  SSN: [id number]    YOB: 1975  Age: 39 y.o. Sex: female       Cardiovascular Function/Vital Signs  Visit Vitals  /78   Pulse 90   Temp 36.4 °C (97.5 °F)   Resp 15   Ht 5' 4.5\" (1.638 m)   Wt 104.5 kg (230 lb 6.1 oz)   SpO2 94%   BMI 38.93 kg/m²       Patient is status post General anesthesia for Procedure(s):  REPAIR OF NONUNION TARSAL METATARSAL 1, RELEASE OF METATARSAL PHALANGEAL JOINT 1 WITH LENGTHENING OF EXTENSOR HALLUCIS LONGUS, REVISION OF SCAR, RELEASE OF NERVE, HARDWARE REMOVAL, BONE MARROW ASPIRATION - APPLICATION OF INCISIONAL WOUND VAC-LEFT FOOT. Nausea/Vomiting: None    Postoperative hydration reviewed and adequate. Pain:  Pain Scale 1: Numeric (0 - 10) (04/01/21 2239)  Pain Intensity 1: 0 (04/01/21 2239)   Managed    Neurological Status:   Neuro (WDL): Exceptions to WDL (04/01/21 2239)  Neuro  LLE Motor Response: Numbness (04/01/21 2239)   At baseline    Mental Status and Level of Consciousness: Alert and oriented to person, place, and time    Pulmonary Status:   O2 Device: Nasal cannula (04/01/21 2239)   Adequate oxygenation and airway patent    Complications related to anesthesia: None    Post-anesthesia assessment completed. No concerns    Signed By: Edna Vo MD     April 1, 2021              Procedure(s):  REPAIR OF NONUNION TARSAL METATARSAL 1, RELEASE OF METATARSAL PHALANGEAL JOINT 1 WITH LENGTHENING OF EXTENSOR HALLUCIS LONGUS, REVISION OF SCAR, RELEASE OF NERVE, HARDWARE REMOVAL, BONE MARROW ASPIRATION - APPLICATION OF INCISIONAL WOUND VAC-LEFT FOOT. general    <[email or web address]>    INITIAL Post-op Vital signs:   Vitals Value Taken Time   /78 04/01/21 2250   Temp 36.4 °C (97.5 °F) 04/01/21 2239   Pulse 92 04/01/21 2253   Resp 12 04/01/21 2253   SpO2 93 % 04/01/21 2253   Vitals shown include unvalidated device data.

## 2021-04-02 NOTE — PROGRESS NOTES
Problem: Mobility Impaired (Adult and Pediatric)  Goal: *Acute Goals and Plan of Care (Insert Text)  Description: FUNCTIONAL STATUS PRIOR TO ADMISSION: Patient was independent and active without use of DME.    HOME SUPPORT PRIOR TO ADMISSION: The patient lived with  and 3 yr old, 12 y o sons but did not require assist.  is RN who will be with her for a week, mother will help her also. Patient will most likely stay upstairs once she gets there. Physical Therapy Goals  Initiated 4/2/2021  1. Patient will move from supine to sit and sit to supine , scoot up and down, and roll side to side in bed with modified independence within 7 day(s). 2.  Patient will transfer from bed to chair and chair to bed with modified independence using the least restrictive device within 7 day(s). 3.  Patient will perform sit to stand with modified independence within 7 day(s). 4.  Patient will ambulate with supervision/set-up for 15 feet with the least restrictive device within 7 day(s). 5.  Patient will ascend/descend 4 stairs with one crutch and one handrail(s) with minimal assistance/contact guard assist within 7 day(s).      Outcome: Progressing Towards Goal   PHYSICAL THERAPY EVALUATION  Patient: Cintia Farias (23 y.o. 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]  Hallux varus (acquired), left foot [M20.32]  Nerve entrapment syndrome of left foot [G57.92]  Procedure(s) (LRB):  REPAIR OF NONUNION TARSAL METATARSAL 1, RELEASE OF METATARSAL PHALANGEAL JOINT 1 WITH LENGTHENING OF EXTENSOR HALLUCIS LONGUS, REVISION OF SCAR, RELEASE OF NERVE, HARDWARE REMOVAL, BONE MARROW ASPIRATION - APPLICATION OF INCISIONAL WOUND VAC-LEFT FOOT (Left) 1 Day Post-Op   Precautions:   Fall, NWB(NWB'ing LLE)    ASSESSMENT  Based on the objective data described below, the patient presents with  impairment in functional mobility, activity tolerance and balance s/p L foot surgery. PLOF: Independent with ADLs and IADLs. Patient lives in a two story home with , 15 steps with one rail to bedroom and bathroom, 6 steps with one rail to enter the home. She plans to stay on second floor. Patient has a 3 yr old son and a 12 yr old son. Patient had foot surgery 2 years ago that resulted in [address] status and she was able to ambulate with crutches and negotiate stairs with her 's help (he is an RN). Patient plans to use a knee scooter. Her  is taking time off to be with her and she will also have her mother to help in the home. Current Level of Function Impacting Discharge (mobility/balance):     Patient provided with crutches for transfers and short distance mobility. Patient demonstrated bed mobility and transfers with contact guard assistance. She ambulated safely with crutches for 10 ft with contact guard-minimal assistance and was able to maintain [address] L status. Patient declined trying stairs as she does not want to flare her pain up (pain management has been an issue). She is certain that she will be able to manage steps at home with 's help. Patient is cleared for discharge from PT standpoint. Functional Outcome Measure: The patient scored 55/100 on the Barthel outcome measure which is indicative of moderate impaired ability to care for basic self-needs/dependency on others. .      Other factors to consider for discharge: [address] Left Lower ExtremityMotivated/A & O x 4/Supportive Family/Independent PLOF      Patient will benefit from skilled therapy intervention to address the above noted impairments.        PLAN :  Recommendations and Planned Interventions: bed mobility training, transfer training, gait training, patient and family training/education, and therapeutic activities      Frequency/Duration: Patient will be followed by physical therapy:  5 times a week to address goals. Recommendation for discharge: (in order for the patient to meet his/her long term goals)  No skilled physical therapy/ follow up rehabilitation needs identified at this time. This discharge recommendation:  Has been made in collaboration with the attending provider and/or case management    IF patient discharges home will need the following DME: axillary crutches: adjusted and provided to patient         SUBJECTIVE:   Patient stated I have had to do this before, 2 years ago ([address] stair negotiation). I used crutches .     OBJECTIVE DATA SUMMARY:   HISTORY:    Past Medical History:   Diagnosis Date    Autoimmune disease ([facility] [address]     GRAVES    GERD (gastroesophageal reflux disease)     Morbid obesity ([facility] [address]     Thyroid disease 2021    GRAVES     Past Surgical History:   Procedure Laterality Date    HX GASTRIC BYPASS      LAP BAND    HX GYN       X1    HX HEENT      WISDOM TEETH    HX ORTHOPAEDIC Left 2018    BUNIONECTOMY    NC ABDOMEN SURGERY PROC UNLISTED      LAP BAND REMOVED       Personal factors and/or comorbidities impacting plan of care: Motivated/A & O x 4/Supportive Family/Independent PLOF     Home Situation  Home Environment: Private residence  # Steps to Enter: 6  Rails to Enter: Yes  Hand Rails : Right  Wheelchair Ramp: No  One/Two Story Residence: Two story  # of Interior Steps: 13  Interior Rails: Right  Lift Chair Available: No  Living Alone: No  Support Systems: Spouse/Significant Other/Partner, Parent  Patient Expects to be Discharged to[de-identified] Private residence  Current DME Used/Available at Home: (knee scooter)    EXAMINATION/PRESENTATION/DECISION MAKING:   Critical Behavior:  Neurologic State: Alert  Orientation Level: Oriented X4  Cognition: Appropriate decision making, Appropriate for age attention/concentration, Appropriate safety awareness     Hearing:   Auditory  Auditory Impairment: None    Range Of Motion:  AROM: Within functional limits(L lower leg and foot limited due to surgery)           PROM: Within functional limits           Strength:    Strength: Within functional limits                    Tone & Sensation:   Tone: Normal              Sensation: Intact               Coordination:  Coordination: Within functional limits  Vision:      Functional Mobility:  Bed Mobility:  Rolling: Contact guard assistance  Supine to Sit: Contact guard assistance  Sit to Supine: Contact guard assistance     Transfers:  Sit to Stand: Contact guard assistance  Stand to Sit: Contact guard assistance                       Balance:   Sitting: Intact  Standing: Impaired; Without support  Standing - Static: Good;Constant support  Standing - Dynamic : Fair;Constant support  Ambulation/Gait Training:  Distance (ft): 10 Feet (ft)  Assistive Device: Crutches  Ambulation - Level of Assistance: Minimal assistance;Contact guard assistance              Left Side Weight Bearing: Non-weight bearing     Stance: Left decreased(NWB'ing left due to surgery)  Speed/Samantha: Slow     Swing Pattern: Left asymmetrical     Interventions: Safety awareness training          Functional Measure:  Barthel Index:    Bathin  Bladder: 10  Bowels: 10  Groomin  Dressin  Feeding: 10  Mobility: 0  Stairs: 0  Toilet Use: 5  Transfer (Bed to Chair and Back): 10  Total: 55/100       The Barthel ADL Index: Guidelines  1. The index should be used as a record of what a patient does, not as a record of what a patient could do. 2. The main aim is to establish degree of independence from any help, physical or verbal, however minor and for whatever reason. 3. The need for supervision renders the patient not independent. 4. A patient's performance should be established using the best available evidence. Asking the patient, friends/relatives and nurses are the usual sources, but direct observation and common sense are also important.  However direct testing is not needed. 5. Usually the patient's performance over the preceding 24-48 hours is important, but occasionally longer periods will be relevant. 6. Middle categories imply that the patient supplies over 50 per cent of the effort. 7. Use of aids to be independent is allowed. Jasvir Singh, Barthel, D.W. ([id number]). Functional evaluation: the Barthel Index. [address] [facility] (14)2. STEPHANIE Sanchez, Cecelia Chiu., Obie Leija., [place], [address] Measuring the change indisability after inpatient rehabilitation; comparison of the responsiveness of the Barthel Index and Functional Carrie[place] Measure. Journal of Neurology, Neurosurgery, and Psychiatry, 66(4), 868-780. Duncan Burt, N.J.A, DRU Mosqueda, & Nely Brice M.A. (2004.) Assessment of post-stroke quality of life in cost-effectiveness studies: The usefulness of the Barthel Index and the EuroQoL-5D. Quality of Life Research, 15, 207-24           Physical Therapy Evaluation Charge Determination   History Examination Presentation Decision-Making   MEDIUM  Complexity : 1-2 comorbidities / personal factors will impact the outcome/ POC  LOW Complexity : 1-2 Standardized tests and measures addressing body structure, function, activity limitation and / or participation in recreation  LOW Complexity : Stable, uncomplicated  LOW Complexity : FOTO score of       Based on the above components, the patient evaluation is determined to be of the following complexity level: LOW     Pain Ratin/10    Activity Tolerance:   Fair-limited primarily by pain    After treatment patient left in no apparent distress:   Supine in bed, Heels elevated for pressure relief, Call bell within reach, Side rails x 3, and nurse notified. COMMUNICATION/EDUCATION:   The patients plan of care was discussed with: Registered nurse.      Fall prevention education was provided and the patient/caregiver indicated understanding., Patient/family have participated as able in goal setting and plan of care. , and Patient/family agree to work toward stated goals and plan of care.     Thank you for this referral.  Vladislav Dozier   Time Calculation: 24 mins

## 2021-04-03 VITALS
BODY MASS INDEX: 38.38 KG/M2 | DIASTOLIC BLOOD PRESSURE: 91 MMHG | WEIGHT: 230.38 LBS | TEMPERATURE: 98.5 F | SYSTOLIC BLOOD PRESSURE: 150 MMHG | HEIGHT: 65 IN | RESPIRATION RATE: 16 BRPM | OXYGEN SATURATION: 98 % | HEART RATE: 76 BPM

## 2021-04-03 PROCEDURE — 99218 HC RM OBSERVATION: CPT

## 2021-04-03 PROCEDURE — 74011250637 HC RX REV CODE- 250/637: Performed by: STUDENT IN AN ORGANIZED HEALTH CARE EDUCATION/TRAINING PROGRAM

## 2021-04-03 PROCEDURE — 97116 GAIT TRAINING THERAPY: CPT

## 2021-04-03 PROCEDURE — 74011250636 HC RX REV CODE- 250/636: Performed by: STUDENT IN AN ORGANIZED HEALTH CARE EDUCATION/TRAINING PROGRAM

## 2021-04-03 PROCEDURE — 74011250636 HC RX REV CODE- 250/636: Performed by: PODIATRIST

## 2021-04-03 PROCEDURE — 74011250637 HC RX REV CODE- 250/637: Performed by: PODIATRIST

## 2021-04-03 PROCEDURE — 96376 TX/PRO/DX INJ SAME DRUG ADON: CPT

## 2021-04-03 RX ORDER — ASPIRIN 325 MG
325 TABLET ORAL DAILY
Qty: 30 TAB | Refills: 0 | Status: SHIPPED | OUTPATIENT
Start: 2021-04-03

## 2021-04-03 RX ORDER — OXYCODONE AND ACETAMINOPHEN 10; 325 MG/1; MG/1
1 TABLET ORAL
Qty: 15 TAB | Refills: 0 | Status: SHIPPED | OUTPATIENT
Start: 2021-04-03 | End: 2021-04-06

## 2021-04-03 RX ADMIN — CALCITRIOL CAPSULES 0.25 MCG 0.5 MCG: 0.25 CAPSULE ORAL at 09:38

## 2021-04-03 RX ADMIN — METHIMAZOLE 5 MG: 5 TABLET ORAL at 09:38

## 2021-04-03 RX ADMIN — OXYCODONE 10 MG: 5 TABLET ORAL at 10:50

## 2021-04-03 RX ADMIN — ACETAMINOPHEN 325 MG: 325 TABLET ORAL at 09:43

## 2021-04-03 RX ADMIN — HYDROMORPHONE HYDROCHLORIDE 0.2 MG: 1 INJECTION, SOLUTION INTRAMUSCULAR; INTRAVENOUS; SUBCUTANEOUS at 03:06

## 2021-04-03 RX ADMIN — OXYCODONE 10 MG: 5 TABLET ORAL at 14:39

## 2021-04-03 RX ADMIN — PROMETHAZINE HYDROCHLORIDE 25 MG: 25 TABLET ORAL at 09:43

## 2021-04-03 RX ADMIN — ACETAMINOPHEN 325 MG: 325 TABLET ORAL at 01:43

## 2021-04-03 RX ADMIN — ACETAMINOPHEN 325 MG: 325 TABLET ORAL at 06:49

## 2021-04-03 RX ADMIN — PROMETHAZINE HYDROCHLORIDE 25 MG: 25 TABLET ORAL at 03:06

## 2021-04-03 RX ADMIN — SODIUM CHLORIDE 25 ML/HR: 9 INJECTION, SOLUTION INTRAVENOUS at 06:57

## 2021-04-03 RX ADMIN — OXYCODONE 10 MG: 5 TABLET ORAL at 06:49

## 2021-04-03 RX ADMIN — OXYCODONE 10 MG: 5 TABLET ORAL at 01:43

## 2021-04-03 NOTE — PROGRESS NOTES
Bedside and Verbal shift change report given to Fleming Runner, RN (oncoming nurse) by Edwige Arora RN (offgoing nurse). Report included the following information SBAR, Kardex, Intake/Output and MAR.

## 2021-04-03 NOTE — PROGRESS NOTES
Bedside and Verbal shift change report given to Tamia Fernandez RN (oncoming nurse) by Madalynn Simmonds, RN (offgoing nurse). Report included the following information SBAR, Procedure Summary, MAR and Recent Results.

## 2021-04-03 NOTE — PROGRESS NOTES
Foot and Ankle Surgery Progress Note    Patient: Fred Baldwin MRN: [id number]  SSN: [id number]    YOB: 1975  Age: 39 y.o. Sex: female      Admit Date: 2021    2 Days Post-Op    Procedure:  Procedure(s):  REPAIR OF NONUNION TARSAL METATARSAL 1, RELEASE OF METATARSAL PHALANGEAL JOINT 1 WITH LENGTHENING OF EXTENSOR HALLUCIS LONGUS, REVISION OF SCAR, RELEASE OF NERVE, HARDWARE REMOVAL, BONE MARROW ASPIRATION - APPLICATION OF INCISIONAL WOUND VAC-LEFT FOOT    Subjective:   Pt seen at bedside this AM.   Patient has no complaints. Objective:     Visit Vitals  /84   Pulse 74   Temp 98.8 °F (37.1 °C)   Resp 16   Ht 5' 4.5\" (1.638 m)   Wt 104.5 kg (230 lb 6.1 oz)   SpO2 97%   BMI 38.93 kg/m²       Temp (24hrs), Av.9 °F (37.2 °C), Min:98.1 °F (36.7 °C), Max:99.8 °F (37.7 °C)      Physical Exam:    GENERAL: alert, cooperative, no distress, appears stated age  LLE with splint in place, c/d/i. Digits with aROM and light touch sensation intact. Incisional vac in place    Assessment:     Hospital Problems  Never Reviewed          Codes Class Noted POA    Contracted, joint ICD-10-CM: M24.50  ICD-9-CM: 718.40  2021 Unknown        Hypertrophic scar ICD-10-CM: L91.0  ICD-9-CM: 701.4  2021 Unknown        Nonunion of subtalar arthrodesis ICD-10-CM: M96.0  ICD-9-CM: 996.78  2021 Unknown        Nerve entrapment syndrome of foot, left ICD-10-CM: G57.92  ICD-9-CM: 355.8  2021 Unknown        Nerve entrapment syndrome of left foot ICD-10-CM: G57.92  ICD-9-CM: 355.8  2021 Unknown        Hallux varus (acquired), left foot ICD-10-CM: M20.32  ICD-9-CM: 735.1  2021 Unknown        Nonunion after arthrodesis ICD-10-CM: M96.0  ICD-9-CM: 996.49, V45.4  2021 Unknown              Plan/Recommendations/Medical Decision MakinF admitted for pain control 2/2 revision of non-union and nerve entrapment.  Transitioned off PCA yesterday with difficulty however, did much better overnight and this AM. Tolerating oxycodone well. Will work with PT this AM.      Plan: discharge today after PT session. continue with new po pain regimen.       #IncisionVac:  Vac may be changed to house vac machine per wound team. Do NOT disturb brenda pad on operative site.  Dressing to remain intact.       Signed By: Nadine Castleman, DPM     April 3, 2021

## 2021-04-03 NOTE — PROGRESS NOTES
Problem: Mobility Impaired (Adult and Pediatric)  Goal: *Acute Goals and Plan of Care (Insert Text)  Description: FUNCTIONAL STATUS PRIOR TO ADMISSION: Patient was independent and active without use of DME.    HOME SUPPORT PRIOR TO ADMISSION: The patient lived with  and 3 yr old, 12 y o sons but did not require assist.  is RN who will be with her for a week, mother will help her also. Patient will most likely stay upstairs once she gets there. Physical Therapy Goals  Initiated 4/2/2021  1. Patient will move from supine to sit and sit to supine , scoot up and down, and roll side to side in bed with modified independence within 7 day(s). 2.  Patient will transfer from bed to chair and chair to bed with modified independence using the least restrictive device within 7 day(s). 3.  Patient will perform sit to stand with modified independence within 7 day(s). 4.  Patient will ambulate with supervision/set-up for 15 feet with the least restrictive device within 7 day(s). 5.  Patient will ascend/descend 4 stairs with one crutch and one handrail(s) with minimal assistance/contact guard assist within 7 day(s).      Outcome: Progressing Towards Goal    PHYSICAL THERAPY TREATMENT  Patient: Kaye Lesch (48 y.o. 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 ASSESSMENT  Patient continues with skilled PT services and is progressing towards goals. Pt reports mobilizing better today. Pt reports better pain control. Pt was able to hop with crutches further today. Pt is planning on renting knee scooter at discharge. Pt was able to verbalize stair technique to access home. Pt deferring stair attempt expressing no concerns. Pt is planing on one crutch and  for task. Pt is aware of how to bump up on bottom if needed. Pt is not expressing any concerns about discharge. Pt will have  who is a RN to assist at home. Pt is cleared from PT stand point. Current Level of Function Impacting Discharge (mobility/balance): CGA    Other factors to consider for discharge: left NWB         PLAN :  Patient continues to benefit from skilled intervention to address the above impairments. Continue treatment per established plan of care. to address goals. Recommendation for discharge: (in order for the patient to meet his/her long term goals)  No skilled physical therapy/ follow up rehabilitation needs identified at this time. This discharge recommendation:  Has not yet been discussed the attending provider and/or case management    IF patient discharges home will need the following DME: patient owns DME required for discharge       SUBJECTIVE:   Patient stated It is not throbbing as much.     OBJECTIVE DATA SUMMARY:   Critical Behavior:  Neurologic State: Alert, Appropriate for age  Orientation Level: Oriented X4  Cognition: Appropriate decision making, Appropriate safety awareness, Follows commands     Functional Mobility Training:  Bed Mobility:     Supine to Sit: Modified independent  Sit to Supine: Modified independent           Transfers:  Sit to Stand: Stand-by assistance  Stand to Sit: Stand-by assistance                             Balance:  Sitting: Intact  Standing: Impaired  Standing - Static: Good  Standing - Dynamic : Fair  Ambulation/Gait Training:  Distance (ft): 40 Feet (ft)  Assistive Device: Crutches;Gait belt  Ambulation - Level of Assistance: Contact guard assistance(hopping pattern )              Left Side Weight Bearing: Non-weight bearing        Speed/Samantha: Slow                      Stairs: Therapeutic Exercises:     Pain Rating: Tolerable     Activity Tolerance:   Limited     After treatment patient left in no apparent distress:   Supine in bed, Call bell within reach, and left LE elevated     COMMUNICATION/COLLABORATION:   The patients plan of care was discussed with: Registered nurse.      Nnamdi Ortiz PTA   Time Calculation: 16 mins

## 2022-03-18 PROBLEM — M24.50 CONTRACTED, JOINT: Status: ACTIVE | Noted: 2021-04-01

## 2022-03-18 PROBLEM — L91.0 HYPERTROPHIC SCAR: Status: ACTIVE | Noted: 2021-04-01

## 2022-03-19 PROBLEM — G57.92 NERVE ENTRAPMENT SYNDROME OF LEFT FOOT: Status: ACTIVE | Noted: 2021-04-01

## 2022-03-19 PROBLEM — G57.92: Status: ACTIVE | Noted: 2021-04-01

## 2022-03-19 PROBLEM — M96.0 NONUNION AFTER ARTHRODESIS: Status: ACTIVE | Noted: 2021-04-01

## 2022-03-19 PROBLEM — M96.0 NONUNION OF SUBTALAR ARTHRODESIS: Status: ACTIVE | Noted: 2021-04-01

## 2022-03-20 PROBLEM — M20.32 HALLUX VARUS (ACQUIRED), LEFT FOOT: Status: ACTIVE | Noted: 2021-04-01

## 2023-05-26 RX ORDER — ASPIRIN 325 MG
325 TABLET ORAL DAILY
COMMUNITY
Start: 2021-04-03

## 2023-05-26 RX ORDER — METHIMAZOLE 5 MG/1
5 TABLET ORAL 2 TIMES DAILY
COMMUNITY

## 2023-05-26 RX ORDER — PROMETHAZINE HYDROCHLORIDE 12.5 MG/1
25 TABLET ORAL EVERY 6 HOURS PRN
COMMUNITY
Start: 2021-04-02

## 2023-05-26 RX ORDER — CALCITRIOL 0.5 UG/1
0.5 CAPSULE, LIQUID FILLED ORAL 2 TIMES DAILY
COMMUNITY

## (undated) DEVICE — TUBING, SUCTION, 1/4" X 12', STRAIGHT: Brand: MEDLINE

## (undated) DEVICE — Z DISCONTINUED USE 2425483 (LOW STOCK PER MEDLINE) TAPE UMB L18IN DIA1/8IN WHT COT NONABSORBABLE W/O NDL FOR

## (undated) DEVICE — TWIST DRILL Ø1.6MM X 30MM, L86MM, AO: Brand: APTUS

## (undated) DEVICE — BUR SURG HD L95MM DIA4MM REPL CARB OVL LINVATEC MEDTRONIC

## (undated) DEVICE — SURGICAL PROCEDURE PACK BASIN MAJ SET CUST NO CAUT

## (undated) DEVICE — STRAP,POSITIONING,KNEE/BODY,FOAM,4X60": Brand: MEDLINE

## (undated) DEVICE — PRECISION THIN (7.0 X 0.38 X 29.5MM)

## (undated) DEVICE — REM POLYHESIVE ADULT PATIENT RETURN ELECTRODE: Brand: VALLEYLAB

## (undated) DEVICE — BONE MARROW KIT ASPIR 11 GA

## (undated) DEVICE — TAPE CST LT 4INX4YD FBRGLS WHT --

## (undated) DEVICE — PADDING CST 4INX4YD --

## (undated) DEVICE — SUTURE VCRL SZ 3-0 L27IN ABSRB UD L26MM SH 1/2 CIR J416H

## (undated) DEVICE — SOLUTION SURG PREP 26 CC PURPREP

## (undated) DEVICE — Z DISCONTINUED PER MEDLINE (LOW STOCK)  USE 2422770 DRAPE C ARM W54XL78IN FOR FLROSCN

## (undated) DEVICE — NEEDLE HYPO 25GA L1.5IN BVL ORIENTED ECLIPSE

## (undated) DEVICE — TUBING SUCT 10FR MAL ALUM SHFT FN CAP VENT UNIV CONN W/ OBT

## (undated) DEVICE — SPONGE GZ W4XL4IN COT 12 PLY TYP VII WVN C FLD DSGN

## (undated) DEVICE — SUTURE VCRL SZ 4-0 L18IN ABSRB UD L16MM PS-4 1/2 CIR PRIM J507G

## (undated) DEVICE — BANDAGE,GAUZE,CONFORMING,2"X75",STRL,LF: Brand: MEDLINE INDUSTRIES, INC.

## (undated) DEVICE — INTENDED FOR TISSUE SEPARATION, AND OTHER PROCEDURES THAT REQUIRE A SHARP SURGICAL BLADE TO PUNCTURE OR CUT.: Brand: BARD-PARKER ® CARBON RIB-BACK BLADES

## (undated) DEVICE — STERILE POLYISOPRENE POWDER-FREE SURGICAL GLOVES WITH EMOLLIENT COATING: Brand: PROTEXIS

## (undated) DEVICE — 2.8 CORTICAL SCREW 22MM, HD7, 1/PKG
Type: IMPLANTABLE DEVICE | Site: FOOT | Status: NON-FUNCTIONAL
Brand: APTUS
Removed: 2021-04-01

## (undated) DEVICE — SOL IRR SOD CL 0.9% 1000ML BTL --

## (undated) DEVICE — SCREW EXT FIX L14MM FOR DISTRCTN

## (undated) DEVICE — SOL IRR STRL H2O 1000ML BTL --

## (undated) DEVICE — LOOP,VESSEL,MAXI,BLUE,2/PK,STERILE: Brand: MEDLINE

## (undated) DEVICE — SUTURE VCRL 2-0 L27IN ABSRB UD CP-2 L26MM 1/2 CIR REV CUT J869H

## (undated) DEVICE — SUTURE ETHBND EXCEL SZ 2 L30IN NONABSORBABLE GRN V-5 L17MM X937H

## (undated) DEVICE — PACK,BASIC,SIRUS,V: Brand: MEDLINE

## (undated) DEVICE — SMALL OSC. SAW BLADE, 9MM X 24.6MM X 0.38MM: Brand: MICROAIRE®

## (undated) DEVICE — PENCIL ES L3M BTTN SWCH S STL HEX LOK BLDE ELECTRD HOLSTER

## (undated) DEVICE — BANDAGE,GAUZE,CONFORMING,3"X75",STRL,LF: Brand: MEDLINE

## (undated) DEVICE — GUARD PIN DIA0.062IN GRN REM SL FOR K WIRE STNMN

## (undated) DEVICE — SYR 10ML LUER LOK 1/5ML GRAD --

## (undated) DEVICE — BANDAGE,GAUZE,BULKEE II,4.5"X4.1YD,STRL: Brand: MEDLINE

## (undated) DEVICE — SUTURE ETHLN SZ 4-0 L18IN NONABSORBABLE BLK L19MM PS-2 3/8 1667H

## (undated) DEVICE — INFECTION CONTROL KIT SYS

## (undated) DEVICE — COVER,MAYO STAND,STERILE: Brand: MEDLINE

## (undated) DEVICE — 2.8 TRILOCK SCREW 14MM, HD7, 1/PKG
Type: IMPLANTABLE DEVICE | Site: FOOT | Status: NON-FUNCTIONAL
Brand: APTUS
Removed: 2021-04-01

## (undated) DEVICE — TWIST DRILL Ø2.35MM X 50MM, L101MM, AO: Brand: APTUS

## (undated) DEVICE — PRECISION OFFSET (9.0 X 0.51 X 31.0MM)

## (undated) DEVICE — 4.0MM EGG